# Patient Record
Sex: MALE | NOT HISPANIC OR LATINO | Employment: STUDENT | URBAN - METROPOLITAN AREA
[De-identification: names, ages, dates, MRNs, and addresses within clinical notes are randomized per-mention and may not be internally consistent; named-entity substitution may affect disease eponyms.]

---

## 2024-03-26 ENCOUNTER — OFFICE VISIT (OUTPATIENT)
Dept: DERMATOLOGY | Facility: CLINIC | Age: 19
End: 2024-03-26
Payer: COMMERCIAL

## 2024-03-26 DIAGNOSIS — L50.9 URTICARIA: Primary | ICD-10-CM

## 2024-03-26 PROCEDURE — 99203 OFFICE O/P NEW LOW 30 MIN: CPT | Performed by: SPECIALIST

## 2024-03-26 RX ORDER — CYPROHEPTADINE HYDROCHLORIDE 4 MG/1
TABLET ORAL
Qty: 30 TABLET | Refills: 2 | Status: SHIPPED | OUTPATIENT
Start: 2024-03-26

## 2024-03-26 RX ORDER — MOMETASONE FUROATE 1 MG/G
CREAM TOPICAL
Qty: 45 G | Refills: 3 | Status: SHIPPED | OUTPATIENT
Start: 2024-03-26

## 2024-03-26 RX ORDER — BETAMETHASONE VALERATE 1 MG/G
CREAM TOPICAL
Qty: 45 G | Refills: 2 | Status: SHIPPED | OUTPATIENT
Start: 2024-03-26

## 2024-03-26 NOTE — PROGRESS NOTES
Subjective   HPI: Heri Ruiz is a 18 y.o. male is here for evaluation and treatment of hives and itching that occur when I walk from cold building or area into a warm or hot area.  This has been happening for quite some time since having teenager.  It has become more noticeable now..     ROS: No other skin or systemic complaints other than what is documented elsewhere in the note.    ALLERGIES: Patient has no known allergies.    SOCIAL:  has no history on file for tobacco use, alcohol use, and drug use.    Objective     Description: No urticarial lesions were observed today however patient was found to be 1+ dermatographic.    Assessment/Plan   1. Urticaria       Plan: I discussed heat induced urticaria and dermographism.  I discussed mast cell function.  I also discussed cholinergic urticaria.  Treatment was discussed starting cyproheptadine.  If cyproheptadine is not totally effective the dermatographic component can best with hydroxyzine at bedtime.  Topical steroids were also be used to control localized breakouts to both body and face.  I discussed checking labs and patient was agreeable.  This was a thorough consultation explaining both heat induced cold induced urticaria and dermographism.  I discussed possible underlying causes or idiopathic flares.    FOLLOW UP: When patient returns home from school however if patient continues to flare at school patient can call this office for changes in medication.  Check SMA-18 CBC and differential and lupus panel.    The patient was encouraged to contact me with any further questions or concerns.  Will Jack MD  3/26/2024